# Patient Record
Sex: FEMALE | URBAN - METROPOLITAN AREA
[De-identification: names, ages, dates, MRNs, and addresses within clinical notes are randomized per-mention and may not be internally consistent; named-entity substitution may affect disease eponyms.]

---

## 2021-05-26 ENCOUNTER — TELEPHONE (OUTPATIENT)
Dept: OTHER | Facility: OTHER | Age: 61
End: 2021-05-26

## 2021-06-14 ENCOUNTER — OFFICE VISIT (OUTPATIENT)
Dept: PODIATRY | Facility: CLINIC | Age: 61
End: 2021-06-14
Payer: COMMERCIAL

## 2021-06-14 VITALS
SYSTOLIC BLOOD PRESSURE: 119 MMHG | HEART RATE: 76 BPM | HEIGHT: 65 IN | BODY MASS INDEX: 25.66 KG/M2 | DIASTOLIC BLOOD PRESSURE: 85 MMHG | WEIGHT: 154 LBS

## 2021-06-14 DIAGNOSIS — M72.2 PLANTAR FASCIITIS: Primary | ICD-10-CM

## 2021-06-14 DIAGNOSIS — M79.671 RIGHT FOOT PAIN: ICD-10-CM

## 2021-06-14 PROCEDURE — 99242 OFF/OP CONSLTJ NEW/EST SF 20: CPT | Performed by: PODIATRIST

## 2021-06-14 RX ORDER — CALCIUM CARBONATE/VITAMIN D3 600 MG-20
TABLET ORAL DAILY
COMMUNITY
Start: 2021-05-25

## 2021-06-14 NOTE — PROGRESS NOTES
Assessment/Plan:    Explained the patient that she had been dealing with plantar fasciitis but now symptoms have resolved  No additional treatment is needed at this time  Reappoint p r n  urged patient to wear shoes that provide support and to avoid sandals  No problem-specific Assessment & Plan notes found for this encounter  Diagnoses and all orders for this visit:    Plantar fasciitis    Right foot pain    Other orders  -     CVS D3 50 MCG (2000 UT) CAPS; daily           Subjective:      Patient ID: Constantine Mcbride is a 64 y o  female  HPI       Patient presents to discuss right arch pain that she had approximately 2 weeks ago  Patient states that the arch became very sore 2 weeks ago but has improved over the past few days  On questioning, she notes that she was wearing sandals and had been barefoot when the pain occurred  She has since discarded the sandals and wore a shoe the provided more support and now is comfortable  The following portions of the patient's history were reviewed and updated as appropriate: allergies, current medications, past family history, past medical history, past social history, past surgical history and problem list     Review of Systems   Respiratory: Negative  Cardiovascular: Negative  Gastrointestinal: Negative  Musculoskeletal: Negative  Neurological: Negative  Objective:      /85   Pulse 76   Ht 5' 4 75" (1 645 m)   Wt 69 9 kg (154 lb)   BMI 25 83 kg/m²          Physical Exam  Constitutional:       Appearance: Normal appearance  Cardiovascular:      Pulses: Normal pulses  Musculoskeletal:         General: Deformity present  Normal range of motion  Comments: Asymptomatic hallux valgus deformity right foot  No pain with palpation right heel or right plantar fascia  Skin:     General: Skin is warm  Neurological:      General: No focal deficit present

## 2021-06-14 NOTE — LETTER
Janene 15, 2021     [place] [address], [address] [address]    Patient: Ed Crews   YOB: 1960   Date of Visit: 6/14/2021       Dear Dr Dominique Carias: Thank you for referring Nicole Castro to me for evaluation  Below are my notes for this consultation  If you have questions, please do not hesitate to call me  I look forward to following your patient along with you  Sincerely,        Azeem Cain DPM        CC: No Recipients  [facility]  6/14/2021  3:43 PM  Signed  Assessment/Plan:    Explained the patient that she had been dealing with plantar fasciitis but now symptoms have resolved  No additional treatment is needed at this time  Reappoint p r n  urged patient to wear shoes that provide support and to avoid sandals  No problem-specific Assessment & Plan notes found for this encounter  Diagnoses and all orders for this visit:    Plantar fasciitis    Right foot pain    Other orders  -     CVS D3 50 MCG (2000 UT) CAPS; daily           Subjective:      Patient ID: Ed Crews is a 64 y o  female  HPI       Patient presents to discuss right arch pain that she had approximately 2 weeks ago  Patient states that the arch became very sore 2 weeks ago but has improved over the past few days  On questioning, she notes that she was wearing sandals and had been barefoot when the pain occurred  She has since discarded the sandals and wore a shoe the provided more support and now is comfortable  The following portions of the patient's history were reviewed and updated as appropriate: allergies, current medications, past family history, past medical history, past social history, past surgical history and problem list     Review of Systems   Respiratory: Negative  Cardiovascular: Negative  Gastrointestinal: Negative  Musculoskeletal: Negative  Neurological: Negative            Objective:      /85   Pulse 76 Ht 5' 4 75" (1 645 m)   Wt 69 9 kg (154 lb)   BMI 25 83 kg/m²          Physical Exam  Constitutional:       Appearance: Normal appearance  Cardiovascular:      Pulses: Normal pulses  Musculoskeletal:         General: Deformity present  Normal range of motion  Comments: Asymptomatic hallux valgus deformity right foot  No pain with palpation right heel or right plantar fascia  Skin:     General: Skin is warm  Neurological:      General: No focal deficit present

## 2024-04-02 ENCOUNTER — TRANSCRIBE ORDERS (OUTPATIENT)
Dept: LAB | Facility: CLINIC | Age: 64
End: 2024-04-02

## 2024-04-02 DIAGNOSIS — Z13.9 SCREENING FOR UNSPECIFIED CONDITION: Primary | ICD-10-CM

## 2024-08-07 ENCOUNTER — EVALUATION (OUTPATIENT)
Dept: PHYSICAL THERAPY | Facility: CLINIC | Age: 64
End: 2024-08-07
Payer: COMMERCIAL

## 2024-08-07 DIAGNOSIS — H81.12 BPPV (BENIGN PAROXYSMAL POSITIONAL VERTIGO), LEFT: ICD-10-CM

## 2024-08-07 PROCEDURE — 97112 NEUROMUSCULAR REEDUCATION: CPT | Performed by: PHYSICAL THERAPIST

## 2024-08-07 PROCEDURE — 97162 PT EVAL MOD COMPLEX 30 MIN: CPT | Performed by: PHYSICAL THERAPIST

## 2024-08-07 NOTE — PROGRESS NOTES
PT Evaluation     Today's date: 2024  Patient name: Ly Rodriguez  : 1960  MRN: [id number]  Referring provider: Irish Barrera PA-C  Dx:   Encounter Diagnosis     ICD-10-CM    1. BPPV (benign paroxysmal positional vertigo), left  H81.12 Ambulatory Referral to Physical Therapy                     Assessment    Assessment details: Pt presents to physical therapy with a diagnosis of BPPV. Pt had negative testing in all positions this date and therapist suspects that with maneuvers by ENT BPPV has been resolved. She did however feel some light dizziness. Therpaist educated pt in normal course of BPPV and habituation exercise to resolve continued light dizziness she is epxeriencing. Encouraged pt to return to all previous activities. Will f/u next week to assess full resolution of symptoms.     Goals  STG/LTG  In one week pt will have no symptoms   In one week pt will understand signs/symptoms that would indicate need for return    Plan    Planned therapy interventions: neuromuscular re-education    Duration in weeks: 2  Plan of Care beginning date: 2024  Plan of Care expiration date: 2024  Treatment plan discussed with: patient        Subjective Evaluation    History of Present Illness  Mechanism of injury: Pt reports having BPPV in the past. Her and her  have done several maneuvers to improve symptoms. She was able to drive the following day but eventually after a few days it got worse again. She was seen that morning at ENT and had maneuver done at ENT office. She didn't feel well the rest of the day. She has been able to drive and feels better this week. Did reports having some dizziness this morning when getting up from bed but it is better than it was.   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[employer])  Exercise history: has been doing knee exercises regularly          Objective   Neuro Exam:     Cervical exam   Modified VBI   Left: asymptomatic  Right: asymptomatic    Positional testing   [place]-Hallpike   Left posterior canal: WNL  Right posterior canal: WNL  Roll test   Left horizontal canal: WNL  Right horizontal canal: WNL             Precautions: na    Access Code: [email or web address]  URL: [email or web address]  Date: 08/07/2024  Prepared by: Suzette Cristobal Vestibular Exercise  - 1 x daily - 7 x weekly - 3 sets - 30 hold  Manuals                                                                 Neuro Re-Ed                                                                                                        Ther Ex                                                                                                                     Ther Activity                                       Gait Training                                       Modalities

## 2024-08-15 ENCOUNTER — OFFICE VISIT (OUTPATIENT)
Dept: PHYSICAL THERAPY | Facility: CLINIC | Age: 64
End: 2024-08-15
Payer: COMMERCIAL

## 2024-08-15 DIAGNOSIS — H81.12 BPPV (BENIGN PAROXYSMAL POSITIONAL VERTIGO), LEFT: Primary | ICD-10-CM

## 2024-08-15 PROCEDURE — 97112 NEUROMUSCULAR REEDUCATION: CPT | Performed by: PHYSICAL THERAPIST

## 2024-08-15 NOTE — PROGRESS NOTES
Daily Note     Today's date: 8/15/2024  Patient name: Ly Rodriguez  : 1960  MRN: [id number]  Referring provider: Irish Barrera PA-C  Dx:   Encounter Diagnosis     ICD-10-CM    1. BPPV (benign paroxysmal positional vertigo), left  H81.12                      Subjective: Has had no dizziness since last session, has done HEP      Objective: See treatment diary below      Assessment: Retested all positions this session and pt remains negative for BPPV. Discussed with patient symptoms that would indicate return to PT, discussed direct access care. Pt voiced understanding. Pt will be d/c at this time with goals met.       Plan: Continue per plan of care.      Precautions: na    Access Code: [email or web address]  URL: [email or web address]  Date: 2024  Prepared by: Suzette Cantor    Exercises  - [place] Vestibular Exercise  - 1 x daily - 7 x weekly - 3 sets - 30 hold  Manuals                                                                 Neuro Re-Ed                                                                                                        Ther Ex                                                                                                                     Ther Activity                                       Gait Training                                       Modalities

## 2024-11-11 ENCOUNTER — APPOINTMENT (OUTPATIENT)
Dept: PHYSICAL THERAPY | Facility: CLINIC | Age: 64
End: 2024-11-11
Payer: COMMERCIAL

## 2024-11-18 ENCOUNTER — OFFICE VISIT (OUTPATIENT)
Dept: PHYSICAL THERAPY | Facility: CLINIC | Age: 64
End: 2024-11-18
Payer: COMMERCIAL

## 2024-11-18 DIAGNOSIS — H81.12 BPPV (BENIGN PAROXYSMAL POSITIONAL VERTIGO), LEFT: Primary | ICD-10-CM

## 2024-11-18 PROCEDURE — 97162 PT EVAL MOD COMPLEX 30 MIN: CPT | Performed by: PHYSICAL THERAPIST

## 2024-11-18 NOTE — PROGRESS NOTES
PT Evaluation     Today's date: 2024  Patient name: Ly Rodriguez  : 1960  MRN: [id number]  Referring provider: Irish Barrera PA-C  Dx: No diagnosis found.               Assessment    Assessment details: Pt presents to physical therapy with a diagnosis of dizziness. At this time patient had positive testing for left posterior canalithiasis or BPPV with 1-2 beat nystagmus in kelly hallpike testing. Completed epley x 2 for the left side. She was additionally symptomatic with a right kelly hallpike maneuver. Plan to reassess at next session for BPPV. Pt will benefit from continued skilled therapy intervention until symptoms resolve.     Goals  STG  Pt will have negative kelly hallpike in 2 weeks    LTG:  Pt will have nno dizziness in 4 weeks.     Plan    Planned therapy interventions: neuromuscular re-education, balance, strengthening, stretching, home exercise program, therapeutic exercise, therapeutic activities, gait training and canalith repositioning    Frequency: 2x week  Plan of Care beginning date: 2024  Plan of Care expiration date: 2024  Treatment plan discussed with: patient        Subjective Evaluation    History of Present Illness  Mechanism of injury: Pt began having dizziness again about two weeks ago. She notices it when she's lying down and her head is turned to the right. She was here in August for BPPV which was resolved. She was hesitatnt to do the maneuver by herself. She had walking pneumonia last week, has a knee surgery next Friday. She also is having pain on the right side of her neck near her occipital bone. She reports the dizziness feels like a spinning sensation. Occurs when she puts her head back when she wakes up in the morning. Lasts for a few minutes. She reports also getting hit in the back of her head when she was in the [place] getting something from a cabinet and that occurred two weeks ago.   Pain  Location: knee pain          Objective   Neuro Exam:      Cervical exam   Modified VBI   Left: asymptomatic  Right: asymptomatic    Positional testing   [place]-Hallpike   Left posterior canal: symptomatic, torsional and upbeating  Right posterior canal: symptomatic  Roll test   Left horizontal canal: WNL  Right horizontal canal: WNL             Precautions: na      Manuals                                                                 Neuro Re-Ed                                                                                                        Ther Ex                                                                                                                     Ther Activity                                       Gait Training                                       Modalities

## 2024-11-19 ENCOUNTER — APPOINTMENT (OUTPATIENT)
Dept: PHYSICAL THERAPY | Facility: CLINIC | Age: 64
End: 2024-11-19
Payer: COMMERCIAL

## 2024-11-20 ENCOUNTER — OFFICE VISIT (OUTPATIENT)
Dept: PHYSICAL THERAPY | Facility: CLINIC | Age: 64
End: 2024-11-20
Payer: COMMERCIAL

## 2024-11-20 DIAGNOSIS — H81.12 BPPV (BENIGN PAROXYSMAL POSITIONAL VERTIGO), LEFT: Primary | ICD-10-CM

## 2024-11-20 PROCEDURE — 97112 NEUROMUSCULAR REEDUCATION: CPT | Performed by: PHYSICAL THERAPIST

## 2024-11-22 NOTE — PROGRESS NOTES
Daily Note     Today's date: 2024  Patient name: Ly Rodriguez  : 1960  MRN: [id number]  Referring provider: Irish Barrera PA-C  Dx:   Encounter Diagnosis     ICD-10-CM    1. BPPV (benign paroxysmal positional vertigo), left  H81.12                      Subjective: Had brief moment of dizziness this morning      Objective: See treatment diary below    [place] hallpike b/l - negative  Roll test b/l - negative      Assessment:Pt had no dizziness with any testing this session. Discussed indication to return to therapy including no resolution of remianing dizziness or return of room spinning sensation. Pt voiced understanding. Pt will be d/c at this time.       Plan: Discharge     Precautions: na      Manuals                                                                 Neuro Re-Ed                                                                                                        Ther Ex                                                                                                                     Ther Activity                                       Gait Training                                       Modalities